# Patient Record
Sex: FEMALE | Race: BLACK OR AFRICAN AMERICAN | NOT HISPANIC OR LATINO | URBAN - METROPOLITAN AREA
[De-identification: names, ages, dates, MRNs, and addresses within clinical notes are randomized per-mention and may not be internally consistent; named-entity substitution may affect disease eponyms.]

---

## 2022-11-22 ENCOUNTER — OFFICE VISIT (OUTPATIENT)
Dept: URBAN - METROPOLITAN AREA CLINIC 118 | Facility: CLINIC | Age: 52
End: 2022-11-22

## 2022-12-12 ENCOUNTER — OFFICE VISIT (OUTPATIENT)
Dept: URBAN - METROPOLITAN AREA CLINIC 118 | Facility: CLINIC | Age: 52
End: 2022-12-12
Payer: MEDICAID

## 2022-12-12 ENCOUNTER — LAB OUTSIDE AN ENCOUNTER (OUTPATIENT)
Dept: URBAN - METROPOLITAN AREA CLINIC 118 | Facility: CLINIC | Age: 52
End: 2022-12-12

## 2022-12-12 ENCOUNTER — DASHBOARD ENCOUNTERS (OUTPATIENT)
Age: 52
End: 2022-12-12

## 2022-12-12 VITALS
HEIGHT: 62 IN | DIASTOLIC BLOOD PRESSURE: 82 MMHG | BODY MASS INDEX: 40.12 KG/M2 | HEART RATE: 56 BPM | WEIGHT: 218 LBS | TEMPERATURE: 97 F | SYSTOLIC BLOOD PRESSURE: 135 MMHG

## 2022-12-12 DIAGNOSIS — D50.9 IRON DEFICIENCY ANEMIA, UNSPECIFIED IRON DEFICIENCY ANEMIA TYPE: ICD-10-CM

## 2022-12-12 PROCEDURE — 99203 OFFICE O/P NEW LOW 30 MIN: CPT

## 2022-12-12 NOTE — HPI-TODAY'S VISIT:
Ms. Paul is a 53 y/o AAF who presents today for colonoscopy consultation.  She was referred by Dr. Yeh and a copy of this documentation will be sent to the referring provider.  Patient has history of JULIUS, for many years now. Labs 9/26/22 show Hgb of 9.0, iron 39, iron sat 10% and ferritin 6. She reports history of heavy mensrural cycles and fibroids but states recently they have not been as heavy.  Patient denies any overt GI bleeding, including melena, hematemesis or hematochezia. She denies abdominal pain, unintentional weight loss, NV or changes in bowel habits. No prior EGD/Colon. Family history of colon cancer in her grandmother, dx in her 80s. Not currently on iron supplementation. Not on blood thinner.

## 2022-12-13 LAB
ABSOLUTE BASOPHILS: 72
ABSOLUTE EOSINOPHILS: 150
ABSOLUTE LYMPHOCYTES: 2694
ABSOLUTE MONOCYTES: 564
ABSOLUTE NEUTROPHILS: 2520
BASOPHILS: 1.2
EOSINOPHILS: 2.5
HEMATOCRIT: 31.7
HEMOGLOBIN: 10.3
IRON BIND.CAP.(TIBC): 403
IRON SATURATION: 12
IRON: 47
LYMPHOCYTES: 44.9
MCH: 25.2
MCHC: 32.5
MCV: 77.5
MONOCYTES: 9.4
MPV: 10.6
NEUTROPHILS: 42
PLATELET COUNT: 389
RDW: 18.4
RED BLOOD CELL COUNT: 4.09
WHITE BLOOD CELL COUNT: 6

## 2022-12-16 ENCOUNTER — TELEPHONE ENCOUNTER (OUTPATIENT)
Dept: URBAN - METROPOLITAN AREA CLINIC 118 | Facility: CLINIC | Age: 52
End: 2022-12-16

## 2023-01-06 PROBLEM — 87522002: Status: ACTIVE | Noted: 2022-12-12

## 2023-01-23 ENCOUNTER — CLAIMS CREATED FROM THE CLAIM WINDOW (OUTPATIENT)
Dept: URBAN - METROPOLITAN AREA SURGERY CENTER 23 | Facility: SURGERY CENTER | Age: 53
End: 2023-01-23

## 2023-01-23 ENCOUNTER — CLAIMS CREATED FROM THE CLAIM WINDOW (OUTPATIENT)
Dept: URBAN - METROPOLITAN AREA SURGERY CENTER 23 | Facility: SURGERY CENTER | Age: 53
End: 2023-01-23
Payer: MEDICAID

## 2023-01-23 DIAGNOSIS — D50.9 ANEMIA: ICD-10-CM

## 2023-01-23 PROCEDURE — G8907 PT DOC NO EVENTS ON DISCHARG: HCPCS | Performed by: INTERNAL MEDICINE

## 2023-01-23 PROCEDURE — 43235 EGD DIAGNOSTIC BRUSH WASH: CPT | Performed by: INTERNAL MEDICINE

## 2023-01-23 PROCEDURE — 45378 DIAGNOSTIC COLONOSCOPY: CPT | Performed by: INTERNAL MEDICINE
